# Patient Record
Sex: MALE | Race: WHITE | Employment: OTHER | URBAN - METROPOLITAN AREA
[De-identification: names, ages, dates, MRNs, and addresses within clinical notes are randomized per-mention and may not be internally consistent; named-entity substitution may affect disease eponyms.]

---

## 2019-09-03 ENCOUNTER — HOSPITAL ENCOUNTER (EMERGENCY)
Age: 39
Discharge: HOME | End: 2019-09-03
Payer: MEDICARE

## 2019-09-03 ENCOUNTER — APPOINTMENT (OUTPATIENT)
Dept: GENERAL RADIOLOGY | Age: 39
End: 2019-09-03
Payer: MEDICARE

## 2019-09-03 VITALS
SYSTOLIC BLOOD PRESSURE: 140 MMHG | TEMPERATURE: 98.1 F | HEART RATE: 86 BPM | RESPIRATION RATE: 16 BRPM | OXYGEN SATURATION: 98 % | DIASTOLIC BLOOD PRESSURE: 84 MMHG | WEIGHT: 315 LBS

## 2019-09-03 DIAGNOSIS — J20.9 ACUTE BRONCHITIS, UNSPECIFIED ORGANISM: Primary | ICD-10-CM

## 2019-09-03 LAB
RAPID INFLUENZA  B AGN: NEGATIVE
RAPID INFLUENZA A AGN: NEGATIVE

## 2019-09-03 PROCEDURE — 99283 EMERGENCY DEPT VISIT LOW MDM: CPT

## 2019-09-03 PROCEDURE — 6370000000 HC RX 637 (ALT 250 FOR IP): Performed by: PHYSICIAN ASSISTANT

## 2019-09-03 PROCEDURE — 94644 CONT INHLJ TX 1ST HOUR: CPT

## 2019-09-03 PROCEDURE — 71046 X-RAY EXAM CHEST 2 VIEWS: CPT

## 2019-09-03 PROCEDURE — 87804 INFLUENZA ASSAY W/OPTIC: CPT

## 2019-09-03 RX ORDER — METHYLPREDNISOLONE 4 MG/1
TABLET ORAL
Qty: 1 KIT | Refills: 0 | Status: SHIPPED | OUTPATIENT
Start: 2019-09-03 | End: 2019-09-09

## 2019-09-03 RX ORDER — PREDNISONE 20 MG/1
60 TABLET ORAL ONCE
Status: COMPLETED | OUTPATIENT
Start: 2019-09-03 | End: 2019-09-03

## 2019-09-03 RX ORDER — BENZONATATE 100 MG/1
100 CAPSULE ORAL 3 TIMES DAILY PRN
Qty: 15 CAPSULE | Refills: 0 | Status: SHIPPED | OUTPATIENT
Start: 2019-09-03 | End: 2019-09-06

## 2019-09-03 RX ORDER — BUSPIRONE HYDROCHLORIDE 30 MG/1
30 TABLET ORAL 3 TIMES DAILY
COMMUNITY

## 2019-09-03 RX ORDER — QUETIAPINE FUMARATE 200 MG/1
100 TABLET, FILM COATED ORAL 2 TIMES DAILY
COMMUNITY

## 2019-09-03 RX ORDER — SERTRALINE HYDROCHLORIDE 100 MG/1
100 TABLET, FILM COATED ORAL 2 TIMES DAILY
COMMUNITY

## 2019-09-03 RX ORDER — IPRATROPIUM BROMIDE AND ALBUTEROL SULFATE 2.5; .5 MG/3ML; MG/3ML
3 SOLUTION RESPIRATORY (INHALATION) ONCE
Status: COMPLETED | OUTPATIENT
Start: 2019-09-03 | End: 2019-09-03

## 2019-09-03 RX ADMIN — PREDNISONE 60 MG: 20 TABLET ORAL at 10:46

## 2019-09-03 RX ADMIN — IPRATROPIUM BROMIDE AND ALBUTEROL SULFATE 3 AMPULE: .5; 3 SOLUTION RESPIRATORY (INHALATION) at 10:59

## 2019-09-03 ASSESSMENT — PAIN SCALES - GENERAL: PAINLEVEL_OUTOF10: 1

## 2019-09-03 NOTE — DISCHARGE INSTR - COC
Continuity of Care Form    Patient Name: Shun Rivers   :  1980  MRN:  [id number]    Admit date:  9/3/2019  Discharge date:  ***    Code Status Order: No Order   Advance Directives:     Admitting Physician:  No admitting provider for patient encounter. PCP: Presley    Discharging Nurse: [facility] Unit/Room#:   Discharging Unit Phone Number: ***    Emergency Contact:   Extended Emergency Contact Information  Primary Emergency Contact: Judy Harper  Address: [address] [place] [address], [address] [place] [address],[address]  Home Phone: [phone]  Relation: Parent  Secondary Emergency Contact: luis harper  Home Phone: [phone]  Mobile Phone: [phone]  Relation: Spouse    Past Surgical History:  Past Surgical History:   Procedure Laterality Date    ESOPHAGUS SURGERY         Immunization History: There is no immunization history on file for this patient. Active Problems: There is no problem list on file for this patient.       Isolation/Infection:   Isolation          No Isolation            Nurse Assessment:  Last Vital Signs: BP (!) 148/79   Pulse 89   Temp 98 °F (36.7 °C) (Oral)   Resp 22   Wt (!) 320 lb (145.2 kg)   SpO2 97%     Last documented pain score (0-10 scale): Pain Level: 1  Last Weight:   Wt Readings from Last 1 Encounters:   19 (!) 320 lb (145.2 kg)     Mental Status:  {IP PT MENTAL STATUS:98007}    IV Access:  { ALFREDA IV ACCESS:971700401}    Nursing Mobility/ADLs:  Walking   {CHP DME GQYH:993355257}  Transfer  {CHP DME SSRK:894258918}  Bathing  {CHP DME XPNK:196516257}  Dressing  {CHP DME HTTK:137942882}  Toileting  {CHP DME OUEI:226222885}  Feeding  {CHP DME LOUV:380074402}  Med Admin  {CHP DME BPQN:047614174}  Med Delivery   { ALFREDA MED Delivery:662959988}    Wound Care Documentation and Therapy:        Elimination:  Continence:   · Bowel: {YES / KX:50371}  · Bladder: {YES / OD:67949}  Urinary Catheter: {Urinary Catheter:069348367} Colostomy/Ileostomy/Ileal Conduit: {YES / TW:}       Date of Last BM: ***  No intake or output data in the 24 hours ending 19 1041  No intake/output data recorded.     Safety Concerns:     [address] [facility] Safety Concerns:702952140}    Impairments/Disabilities:      [address] [facility] Impairments/Disabilities:213929670}    Nutrition Therapy:  Current Nutrition Therapy:   [address] [facility] Diet List:359054731}    Routes of Feeding: {CHP DME Other Feedings:284455078}  Liquids: {Slp liquid thickness:44848}  Daily Fluid Restriction: {CHP DME Yes amt example:389915595}  Last Modified Barium Swallow with Video (Video Swallowing Test): {Done Not Done OAIP:848886024}    Treatments at the Time of Hospital Discharge:   Respiratory Treatments: ***  Oxygen Therapy:  {Therapy; copd oxygen:57939}  Ventilator:    { CC Vent ZZNE:549988319}    Rehab Therapies: {THERAPEUTIC INTERVENTION:0184680027}  Weight Bearing Status/Restrictions: [facility] Weight Bearin}  Other Medical Equipment (for information only, NOT a DME order):  {EQUIPMENT:937214536}  Other Treatments: ***    Patient's personal belongings (please select all that are sent with patient):  {[facility] DME Belongings:285606731}    RN SIGNATURE:  {Esignature:980261499}    CASE MANAGEMENT/SOCIAL WORK SECTION    Inpatient Status Date: ***    Readmission Risk Assessment Score:  Readmission Risk              Risk of Unplanned Readmission:        0           Discharging to Facility/ Agency   · Name:   · Address:  · Phone:  · Fax:    Dialysis Facility (if applicable)   · Name:  · Address:  · Dialysis Schedule:  · Phone:  · Fax:    / signature: {Esignature:934779912}    PHYSICIAN SECTION    Prognosis: {Prognosis:6560778260}    Condition at Discharge: [address] Patient Condition:402155159}    Rehab Potential (if transferring to Rehab): {Prognosis:5802943971}    Recommended Labs or Other Treatments After Discharge: ***    Physician Certification: I certify the above information

## 2019-09-03 NOTE — ED PROVIDER NOTES
clear without exudate. ENT: Mucous membranes are moist.  Oropharynx is without erythema, edema, or exudate. Uvula is midline without unilateral swelling. Floor the mouth soft and nonraised. No trismus appreciated. Patient is controlling secretions appropriately. Nasal turbinates unremarkable and nares patent bilaterally. No pain with manipulation of the external ears. No mastoid tenderness. Canals are clear without edema or exudate. TMs are pink and pearly without bulge, retraction, or loss of landmarks. No signs of TM rupture. LYMPH: No periauricular, submental, or cervical lymphadenopathy. NECK: Supple. Normal ROM. No JVD. No tracheal tenderness or deviation. No crepitus. CHEST: Equal symmetric chest rise. Heart is regular rate and rhythm without murmur, rub, or gallop. Lung fields clear to auscultation bilaterally without wheezes, rhonchi, rales. LUNGS: Breathing is unlabored. Speaking comfortably in full sentences. No nasal flaring, retractions, or use of accessory muscles. Lung fields are clear auscultation bilaterally without wheezes, rhonchi, rales. No dullness or hyperresonance to percussion. Abdomen: Nondistended and nontender. EXTREMITIES: MAEE. No acute deformities. Distal pulses +2 and cap refill less than 5 seconds. SKIN: Warm and dry. No rashes, clubbing, or cyanosis. NEUROLOGICAL: Alert and oriented. Strength is 5/5 in all extremities and sensation is intact. RADIOLOGY  Xr Chest Standard (2 Vw)    Result Date: 9/3/2019  EXAMINATION: TWO XRAY VIEWS OF THE CHEST 9/3/2019 10:08 am COMPARISON: 01/03/2014 HISTORY: Reason for exam:->cough FINDINGS: The lungs are without acute focal process. No effusion or pneumothorax. The cardiomediastinal silhouette is normal.  The osseous structures are intact without acute process. Negative chest.     ED COURSE   I have independently evaluated this patient.       Patient received prednisone and several breathing treatments for pain,

## 2021-07-29 ENCOUNTER — INITIAL CONSULT (OUTPATIENT)
Dept: BARIATRICS/WEIGHT MGMT | Age: 41
End: 2021-07-29
Payer: COMMERCIAL

## 2021-07-29 VITALS
BODY MASS INDEX: 42.66 KG/M2 | WEIGHT: 315 LBS | HEIGHT: 72 IN | DIASTOLIC BLOOD PRESSURE: 93 MMHG | SYSTOLIC BLOOD PRESSURE: 131 MMHG | HEART RATE: 89 BPM

## 2021-07-29 DIAGNOSIS — K44.9 HIATAL HERNIA WITH GERD: ICD-10-CM

## 2021-07-29 DIAGNOSIS — E66.01 MORBID OBESITY WITH BMI OF 45.0-49.9, ADULT (HCC): Primary | ICD-10-CM

## 2021-07-29 DIAGNOSIS — K21.9 HIATAL HERNIA WITH GERD: ICD-10-CM

## 2021-07-29 DIAGNOSIS — I10 HYPERTENSION, ESSENTIAL: ICD-10-CM

## 2021-07-29 PROCEDURE — 99204 OFFICE O/P NEW MOD 45 MIN: CPT | Performed by: SURGERY

## 2021-07-29 PROCEDURE — G8427 DOCREV CUR MEDS BY ELIG CLIN: HCPCS | Performed by: SURGERY

## 2021-07-29 PROCEDURE — 1036F TOBACCO NON-USER: CPT | Performed by: SURGERY

## 2021-07-29 PROCEDURE — G8417 CALC BMI ABV UP PARAM F/U: HCPCS | Performed by: SURGERY

## 2021-07-29 RX ORDER — DIAZEPAM 5 MG/1
5 TABLET ORAL 2 TIMES DAILY
COMMUNITY
Start: 2021-07-23

## 2021-07-29 RX ORDER — ATENOLOL 50 MG/1
50 TABLET ORAL DAILY
COMMUNITY
Start: 2021-07-08

## 2021-08-27 NOTE — PROGRESS NOTES
[facility]) Physicians   General & Laparoscopic Surgery  [employer]       HPI:  Marquita Davies is a very pleasant 37 y.o. male  who is referred for consultation by Dr. Konstantin Feldman with regards severe reflux disease. Patient had a hiatal hernia repair by Dr. Sonia Rosa without fundoplication  Patient was obese but has gained significant weight since hiatal hernia repair      Past Medical History:   Diagnosis Date    Asthma     Depression     GERD (gastroesophageal reflux disease)     Hypertension, essential      Past Surgical History:   Procedure Laterality Date    ESOPHAGUS SURGERY       No family history on file. Social History     Tobacco Use    Smoking status: Never Smoker    Smokeless tobacco: Never Used   Substance Use Topics    Alcohol use: Never     I counseled the patient on the importance of not smoking and risks of ETOH. No Known Allergies  Vitals:    07/29/21 1008   BP: (!) 131/93   Pulse: 89   Weight: (!) 359 lb (162.8 kg)   Height: 6' (1.829 m)       Body mass index is 48.69 kg/m². Current Outpatient Medications:     diazePAM (VALIUM) 5 MG tablet, Take 5 mg by mouth 2 times daily. , Disp: , Rfl:     atenolol (TENORMIN) 50 MG tablet, Take 50 mg by mouth daily, Disp: , Rfl:     sertraline (ZOLOFT) 100 MG tablet, Take 100 mg by mouth 2 times daily, Disp: , Rfl:     busPIRone (BUSPAR) 30 MG tablet, Take 30 mg by mouth 3 times daily, Disp: , Rfl:     QUEtiapine (SEROQUEL) 200 MG tablet, Take 100 mg by mouth 2 times daily, Disp: , Rfl:     ondansetron (ZOFRAN ODT) 4 MG disintegrating tablet, Take 1 tablet by mouth every 8 hours as needed for Nausea., Disp: 10 tablet, Rfl: 0    ROS  Review of Systems - History obtained from the patient  General ROS: negative  Psychological ROS: negative  Ophthalmic ROS: negative  Neurological ROS: negative  ENT ROS: negative  Allergy and Immunology ROS: negative  Hematological and Lymphatic ROS: negative  Endocrine ROS: negative  Respiratory ROS: negative  Cardiovascular ROS: negative  Gastrointestinal ROS:Heartburn, nausea  Genito-Urinary ROS: negative  Musculoskeletal ROS: negative   Skin ROS: negative        Physical Exam   Constitutional: Patient is oriented to person, place, and time. Vital signs are normal. Patient  appears well-developed and well-nourished. Patient  is active and cooperative. Non-toxic appearance. No distress. HENT:   Head: Normocephalic and atraumatic. Head is without laceration. Right Ear: External ear normal. No lacerations. No drainage, swelling or tenderness. Left Ear: External ear normal. No lacerations. No drainage, swelling or tenderness. Nose: Nose normal. No nose lacerations or nasal deformity. Mouth/Throat: Uvula is midline, oropharynx is clear and moist and mucous membranes are normal. No oropharyngeal exudate. Eyes: Conjunctivae, EOM and lids are normal. Pupils are equal, round, and reactive to light. Right eye exhibits no discharge. No foreign body present in the right eye. Left eye exhibits no discharge. No foreign body present in the left eye. No scleral icterus. Neck: Trachea normal and normal range of motion. Neck supple. No JVD present. No tracheal tenderness present. Carotid bruit is not present. No rigidity. No tracheal deviation and no edema present. No thyromegaly present. Cardiovascular: Normal rate, regular rhythm, normal heart sounds, intact distal pulses and normal pulses. Pulmonary/Chest: Effort normal and breath sounds normal. No stridor. No respiratory distress. Patient  has no wheezes. Patient has no rales. Patient exhibits no tenderness and no crepitus. Abdominal: Soft. Normal appearance and bowel sounds are normal. Patient exhibits no distension, no abdominal bruit, no ascites and no mass. There is no hepatosplenomegaly. There is no tenderness. There is no rigidity, no rebound, no guarding and no CVA tenderness. No hernia. Hernia confirmed negative in the ventral area.

## 2021-09-29 ENCOUNTER — TELEPHONE (OUTPATIENT)
Dept: BARIATRICS/WEIGHT MGMT | Age: 41
End: 2021-09-29

## 2022-04-13 ENCOUNTER — TELEPHONE (OUTPATIENT)
Dept: BARIATRICS/WEIGHT MGMT | Age: 42
End: 2022-04-13

## 2022-04-13 NOTE — TELEPHONE ENCOUNTER
lvm regarding BMI    Pt DOES HAVE coverage with [employer], 0 diet.     BMI form scanned into media

## 2022-08-04 ENCOUNTER — TELEPHONE (OUTPATIENT)
Dept: BARIATRICS/WEIGHT MGMT | Age: 42
End: 2022-08-04